# Patient Record
Sex: FEMALE | Race: WHITE | NOT HISPANIC OR LATINO | URBAN - METROPOLITAN AREA
[De-identification: names, ages, dates, MRNs, and addresses within clinical notes are randomized per-mention and may not be internally consistent; named-entity substitution may affect disease eponyms.]

---

## 2023-02-14 ENCOUNTER — APPOINTMENT (RX ONLY)
Dept: URBAN - METROPOLITAN AREA CLINIC 184 | Facility: CLINIC | Age: 68
Setting detail: DERMATOLOGY
End: 2023-02-14

## 2023-02-14 DIAGNOSIS — D22 MELANOCYTIC NEVI: ICD-10-CM | Status: STABLE

## 2023-02-14 DIAGNOSIS — L81.4 OTHER MELANIN HYPERPIGMENTATION: ICD-10-CM | Status: STABLE

## 2023-02-14 DIAGNOSIS — D18.0 HEMANGIOMA: ICD-10-CM

## 2023-02-14 DIAGNOSIS — L82.1 OTHER SEBORRHEIC KERATOSIS: ICD-10-CM

## 2023-02-14 DIAGNOSIS — L57.0 ACTINIC KERATOSIS: ICD-10-CM

## 2023-02-14 PROBLEM — D22.5 MELANOCYTIC NEVI OF TRUNK: Status: ACTIVE | Noted: 2023-02-14

## 2023-02-14 PROBLEM — D18.01 HEMANGIOMA OF SKIN AND SUBCUTANEOUS TISSUE: Status: ACTIVE | Noted: 2023-02-14

## 2023-02-14 PROBLEM — D22.71 MELANOCYTIC NEVI OF RIGHT LOWER LIMB, INCLUDING HIP: Status: ACTIVE | Noted: 2023-02-14

## 2023-02-14 PROBLEM — D23.71 OTHER BENIGN NEOPLASM OF SKIN OF RIGHT LOWER LIMB, INCLUDING HIP: Status: ACTIVE | Noted: 2023-02-14

## 2023-02-14 PROBLEM — D22.61 MELANOCYTIC NEVI OF RIGHT UPPER LIMB, INCLUDING SHOULDER: Status: ACTIVE | Noted: 2023-02-14

## 2023-02-14 PROBLEM — D22.72 MELANOCYTIC NEVI OF LEFT LOWER LIMB, INCLUDING HIP: Status: ACTIVE | Noted: 2023-02-14

## 2023-02-14 PROBLEM — D22.62 MELANOCYTIC NEVI OF LEFT UPPER LIMB, INCLUDING SHOULDER: Status: ACTIVE | Noted: 2023-02-14

## 2023-02-14 PROCEDURE — ? LIQUID NITROGEN

## 2023-02-14 PROCEDURE — 99213 OFFICE O/P EST LOW 20 MIN: CPT | Mod: 25

## 2023-02-14 PROCEDURE — ? COUNSELING

## 2023-02-14 PROCEDURE — 17000 DESTRUCT PREMALG LESION: CPT

## 2023-02-14 ASSESSMENT — LOCATION DETAILED DESCRIPTION DERM
LOCATION DETAILED: RIGHT ANTERIOR DISTAL UPPER ARM
LOCATION DETAILED: LEFT ANTERIOR PROXIMAL THIGH
LOCATION DETAILED: LEFT UPPER CUTANEOUS LIP
LOCATION DETAILED: RIGHT ANTERIOR PROXIMAL UPPER ARM
LOCATION DETAILED: LEFT ANTERIOR DISTAL THIGH
LOCATION DETAILED: LEFT ANTERIOR DISTAL UPPER ARM
LOCATION DETAILED: MIDDLE STERNUM
LOCATION DETAILED: LEFT SUPERIOR UPPER BACK
LOCATION DETAILED: LEFT ANTERIOR PROXIMAL UPPER ARM
LOCATION DETAILED: RIGHT ANTERIOR PROXIMAL THIGH
LOCATION DETAILED: EPIGASTRIC SKIN
LOCATION DETAILED: PERIUMBILICAL SKIN
LOCATION DETAILED: RIGHT ANTERIOR DISTAL THIGH

## 2023-02-14 ASSESSMENT — LOCATION SIMPLE DESCRIPTION DERM
LOCATION SIMPLE: CHEST
LOCATION SIMPLE: ABDOMEN
LOCATION SIMPLE: LEFT THIGH
LOCATION SIMPLE: LEFT LIP
LOCATION SIMPLE: RIGHT THIGH
LOCATION SIMPLE: LEFT UPPER BACK
LOCATION SIMPLE: RIGHT UPPER ARM
LOCATION SIMPLE: LEFT UPPER ARM

## 2023-02-14 ASSESSMENT — LOCATION ZONE DERM
LOCATION ZONE: TRUNK
LOCATION ZONE: LIP
LOCATION ZONE: ARM
LOCATION ZONE: LEG

## 2023-02-14 NOTE — PROCEDURE: COUNSELING
Sunscreen Recommendations: Discussed sunblock should be applied to exposed areas daily, and be reapplied every two hours while exposed . The sunblock should be broad spectrum SPF-50, UVA/UVB and contain Zinc and Titanium Dioxide(Blue Lizard & [employer] Crimes Bum are some good OTC brands). Strongly recommend Elta MD products. Recommend sun protective clothing such as long sleeve UPF protective shirts, wide brim hats, neck covers and sunglasses as it has been proven to prevent further photo damage from the sun.
Detail Level: Zone
Sunscreen Recommendations: Discussed sunblock should be applied to exposed areas daily, and be reapplied every two hours while exposed . The sunblock should be broad spectrum SPF-50, UVA/UVB and contain Zinc and Titanium Dioxide(Blue Lizard & [employer] [place] Bum are some good OTC brands). Strongly recommend Elta MD products. Recommend sun protective clothing such as long sleeve UPF protective shirts, wide brim hats, neck covers and sunglasses as it has been proven to prevent further photo damage from the sun.
Detail Level: Detailed

## 2024-05-01 ENCOUNTER — APPOINTMENT (RX ONLY)
Dept: URBAN - METROPOLITAN AREA CLINIC 146 | Facility: CLINIC | Age: 69
Setting detail: DERMATOLOGY
End: 2024-05-01

## 2024-05-01 DIAGNOSIS — I87.2 VENOUS INSUFFICIENCY (CHRONIC) (PERIPHERAL): ICD-10-CM

## 2024-05-01 PROCEDURE — ? ADDITIONAL NOTES

## 2024-05-01 NOTE — HPI: VEIN EVALUATION
Which Leg Is Worse?: Equally Affected
How Severe Is/Are Your Symptoms?: moderate
Is This A New Presentation, Or A Follow-Up?: Veins

## 2024-05-01 NOTE — PROCEDURE: ADDITIONAL NOTES
Additional Notes: Patient came in for initial consult, ultrasound reveals venous insufficiency to left gsv. Patient has compression stockings from previous treatment. Will return for her medical vein appointment \\n\\nPatient presents with one or more of the following: (1) Symptomatic varices, (2) Swelling, (3) Skin changes, (4) Heaviness, (5) New or past venous ulcers.\\n\\nAn examination with ultrasound was done. The deep, superficial venous, and arterial system was evaluated. Based on the findings the patient has symptomatic superficial venous disease. Findings of the deep venous system was also noted. The etiology of the patient’s symptoms was explained. A return visit is scheduled for a more detailed ultrasound/examination and insurance authorization submission if applicable.
Detail Level: Simple
Render Risk Assessment In Note?: no

## 2024-07-08 ENCOUNTER — APPOINTMENT (RX ONLY)
Dept: URBAN - METROPOLITAN AREA CLINIC 146 | Facility: CLINIC | Age: 69
Setting detail: DERMATOLOGY
End: 2024-07-08

## 2024-07-08 DIAGNOSIS — I87.2 VENOUS INSUFFICIENCY (CHRONIC) (PERIPHERAL): ICD-10-CM | Status: WORSENING

## 2024-07-08 PROCEDURE — ? ADDITIONAL NOTES

## 2024-07-08 PROCEDURE — ? MEDICAL CONSULTATION: VENOUS DISEASE

## 2024-07-08 PROCEDURE — ? CEAP-C CLASSIFICATION

## 2024-07-08 PROCEDURE — ? LOWER EXTREMITY DOPPLER US

## 2024-07-08 PROCEDURE — 93971 EXTREMITY STUDY: CPT

## 2024-07-08 PROCEDURE — 99213 OFFICE O/P EST LOW 20 MIN: CPT

## 2024-07-08 PROCEDURE — ? SEPARATE AND IDENTIFIABLE DOCUMENTATION

## 2024-07-08 ASSESSMENT — LOCATION DETAILED DESCRIPTION DERM
LOCATION DETAILED: LEFT ANTERIOR DISTAL THIGH
LOCATION DETAILED: LEFT ANTERIOR PROXIMAL THIGH
LOCATION DETAILED: LEFT PROXIMAL PRETIBIAL REGION

## 2024-07-08 ASSESSMENT — LOCATION SIMPLE DESCRIPTION DERM
LOCATION SIMPLE: LEFT PRETIBIAL REGION
LOCATION SIMPLE: LEFT THIGH

## 2024-07-08 ASSESSMENT — LOCATION ZONE DERM: LOCATION ZONE: LEG

## 2024-07-08 NOTE — PROCEDURE: CEAP-C CLASSIFICATION
Left Leg: Peripheral Vascular Disease?: No
Left Dorsalis Pedis Pulse: 2 (Easily palpable)
Left Leg Circumference: medium
Follow Up Instructions:: Patient will follow up after the bilateral duplex ultrasound venous reflux study to review the results and finalize treatment plan. The patient must wear compression stockings. Preventive strategies include weight loss through diet and exercise and toning leg muscles. A venous fact sheet was given, which reviews venous anatomy/pathophysiology and treatment options. The pathophysiology of venous disease and potential treatment options were discussed in detail, especially the non-FDA status of foam sclerotherapy with its risks benefits and alternatives. The patient's questions were answered in full.
Detail Level: Detailed
Show Diagnoses Variable: Yes

## 2024-07-08 NOTE — PROCEDURE: LOWER EXTREMITY DOPPLER US
Intraluminal Thrombus: No
Detail Level: Simple
Size: 5-6 mm
Continue Conservative Therapy: Yes
Reflux: 3.0-3.5 seconds
Left Intraluminal Thrombus- No: The left deep veins were imaged from the level of the common femoral vein to the posterior tibial veins. All deep veins demonstrated compressibility without evidence of intraluminal thrombus.
Left Intraluminal Thrombus- Yes: The left deep veins were imaged from the level of the common femoral vein to the posterior tibial veins. There was evidence of intraluminal thrombus as noted above.
Right Intraluminal Thrombus- Yes: The right deep veins were imaged from the level of the common femoral vein to the posterior tibial veins. There was evidence of intraluminal thrombus as noted above.
Size: 6-7 mm
Reflux Options: Use Range
See Attached Documentation Text: Please refer to the attached ultrasound documentation for complete details of the procedure and the venous findings.
Right Intraluminal Thrombus- No: The right deep veins were imaged from the level of the common femoral vein to the posterior tibial veins. All deep veins demonstrated compressibility without evidence of intraluminal thrombus.
Continue Conservative Therapy Text: Continue conservative treatment (such as compression stockings, OTC analgesics, and exercise) and consider intervention if no change or worsening symptoms to varicosities.
Left Tributary Size: 4-5 mm

## 2024-07-08 NOTE — PROCEDURE: MEDICAL CONSULTATION: VENOUS DISEASE
Left Leg Pain: 2- Daily, moderate activity limitation, occasional analgesics
Right Leg Varicose Veins: 0- None
Left Dorsalis Pedis Pulse: 2 (Easily palpable)
Left Leg Varicose Veins: 2- Multiple: GS varicose veins confined to calf or thigh
Detail Level: Detailed
Right Leg: Peripheral Vascular Disease?: No
Right Leg Circumference: medium
Left Leg Venous Edema: 2- Afternoon edema above ankle
Right Leg Venous Hyperpigmentation: 0- None or focal low intensity (tan)
Follow Up Instructions:: Patient will follow up after the bilateral duplex ultrasound venous reflux study to review the results and finalize treatment plan. The patient must wear compression stockings. Preventive strategies include weight loss through diet and exercise and toning leg muscles. A venous fact sheet was given, which reviews venous anatomy/pathophysiology and treatment options. The pathophysiology of venous disease and potential treatment options were discussed in detail, especially the non-FDA status of foam sclerotherapy with its risks benefits and alternatives. The patient's questions were answered in full.
Include Vcss In The Note?: Yes
Right Leg Compression Therapy: 0- None or noncompliant
Left Leg Compression Therapy: 3- Full compliance: stockings and elevation

## 2024-10-02 ENCOUNTER — APPOINTMENT (RX ONLY)
Dept: URBAN - METROPOLITAN AREA CLINIC 184 | Facility: CLINIC | Age: 69
Setting detail: DERMATOLOGY
End: 2024-10-02

## 2024-10-02 DIAGNOSIS — L81.4 OTHER MELANIN HYPERPIGMENTATION: ICD-10-CM

## 2024-10-02 DIAGNOSIS — D49.2 NEOPLASM OF UNSPECIFIED BEHAVIOR OF BONE, SOFT TISSUE, AND SKIN: ICD-10-CM

## 2024-10-02 DIAGNOSIS — L57.8 OTHER SKIN CHANGES DUE TO CHRONIC EXPOSURE TO NONIONIZING RADIATION: ICD-10-CM | Status: INADEQUATELY CONTROLLED

## 2024-10-02 DIAGNOSIS — L57.0 ACTINIC KERATOSIS: ICD-10-CM

## 2024-10-02 DIAGNOSIS — I83.9 ASYMPTOMATIC VARICOSE VEINS OF LOWER EXTREMITIES: ICD-10-CM

## 2024-10-02 DIAGNOSIS — D22 MELANOCYTIC NEVI: ICD-10-CM

## 2024-10-02 DIAGNOSIS — L82.1 OTHER SEBORRHEIC KERATOSIS: ICD-10-CM

## 2024-10-02 DIAGNOSIS — L85.3 XEROSIS CUTIS: ICD-10-CM | Status: INADEQUATELY CONTROLLED

## 2024-10-02 DIAGNOSIS — D18.0 HEMANGIOMA: ICD-10-CM

## 2024-10-02 DIAGNOSIS — Z71.89 OTHER SPECIFIED COUNSELING: ICD-10-CM

## 2024-10-02 PROBLEM — D22.5 MELANOCYTIC NEVI OF TRUNK: Status: ACTIVE | Noted: 2024-10-02

## 2024-10-02 PROBLEM — I83.93 ASYMPTOMATIC VARICOSE VEINS OF BILATERAL LOWER EXTREMITIES: Status: ACTIVE | Noted: 2024-10-02

## 2024-10-02 PROBLEM — D18.01 HEMANGIOMA OF SKIN AND SUBCUTANEOUS TISSUE: Status: ACTIVE | Noted: 2024-10-02

## 2024-10-02 PROCEDURE — ? SUNSCREEN TREATMENT REGIMEN

## 2024-10-02 PROCEDURE — ? PRESCRIPTION

## 2024-10-02 PROCEDURE — ? COUNSELING

## 2024-10-02 PROCEDURE — ? SUNSCREEN RECOMMENDATIONS

## 2024-10-02 PROCEDURE — 99214 OFFICE O/P EST MOD 30 MIN: CPT | Mod: 25

## 2024-10-02 PROCEDURE — ? ADDITIONAL NOTES

## 2024-10-02 PROCEDURE — 11102 TANGNTL BX SKIN SINGLE LES: CPT

## 2024-10-02 PROCEDURE — ? BIOPSY BY SHAVE METHOD

## 2024-10-02 PROCEDURE — ? OTC TREATMENT REGIMEN

## 2024-10-02 PROCEDURE — 11103 TANGNTL BX SKIN EA SEP/ADDL: CPT

## 2024-10-02 RX ORDER — FLUOROURACIL 5 MG/G
CREAM TOPICAL BID
Qty: 40 | Refills: 2 | Status: ERX | COMMUNITY
Start: 2024-10-02

## 2024-10-02 RX ADMIN — FLUOROURACIL: 5 CREAM TOPICAL at 00:00

## 2024-10-02 ASSESSMENT — LOCATION DETAILED DESCRIPTION DERM
LOCATION DETAILED: RIGHT SUPERIOR FOREHEAD
LOCATION DETAILED: INFERIOR THORACIC SPINE
LOCATION DETAILED: LEFT SUPERIOR UPPER BACK
LOCATION DETAILED: LEFT SUPERIOR MEDIAL MIDBACK
LOCATION DETAILED: INFERIOR MID FOREHEAD
LOCATION DETAILED: RIGHT PROXIMAL PRETIBIAL REGION
LOCATION DETAILED: SUPERIOR THORACIC SPINE
LOCATION DETAILED: LEFT INFERIOR LATERAL MIDBACK
LOCATION DETAILED: RIGHT ANTERIOR DISTAL UPPER ARM
LOCATION DETAILED: LEFT ANTERIOR DISTAL UPPER ARM
LOCATION DETAILED: RIGHT ANTERIOR PROXIMAL UPPER ARM
LOCATION DETAILED: LEFT MEDIAL UPPER BACK
LOCATION DETAILED: RIGHT DISTAL PRETIBIAL REGION
LOCATION DETAILED: RIGHT MID-UPPER BACK
LOCATION DETAILED: RIGHT SUPERIOR CENTRAL MALAR CHEEK
LOCATION DETAILED: MIDDLE STERNUM
LOCATION DETAILED: LEFT PROXIMAL PRETIBIAL REGION
LOCATION DETAILED: LEFT DISTAL PRETIBIAL REGION

## 2024-10-02 ASSESSMENT — LOCATION SIMPLE DESCRIPTION DERM
LOCATION SIMPLE: UPPER BACK
LOCATION SIMPLE: RIGHT FOREHEAD
LOCATION SIMPLE: LEFT PRETIBIAL REGION
LOCATION SIMPLE: RIGHT UPPER BACK
LOCATION SIMPLE: CHEST
LOCATION SIMPLE: RIGHT CHEEK
LOCATION SIMPLE: INFERIOR FOREHEAD
LOCATION SIMPLE: LEFT LOWER BACK
LOCATION SIMPLE: RIGHT UPPER ARM
LOCATION SIMPLE: LEFT UPPER ARM
LOCATION SIMPLE: LEFT UPPER BACK
LOCATION SIMPLE: RIGHT PRETIBIAL REGION

## 2024-10-02 ASSESSMENT — LOCATION ZONE DERM
LOCATION ZONE: ARM
LOCATION ZONE: TRUNK
LOCATION ZONE: LEG
LOCATION ZONE: FACE

## 2024-10-02 NOTE — PROCEDURE: BIOPSY BY SHAVE METHOD
Detail Level: Detailed
Depth Of Biopsy: dermis
Was A Bandage Applied: Yes
Size Of Lesion In Cm: 0
Biopsy Type: H and E
Biopsy Method: Personna blade
Anesthesia Type: 1% lidocaine with epinephrine
Anesthesia Volume In Cc: 0.5
Hemostasis: Aluminum Chloride
Wound Care: Vaseline
Dressing: Band-Aid
Destruction After The Procedure: No
Type Of Destruction Used: Curettage
Curettage Text: The wound bed was treated with curettage after the biopsy was performed.
Cryotherapy Text: The wound bed was treated with cryotherapy after the biopsy was performed.
Electrodesiccation Text: The wound bed was treated with electrodesiccation after the biopsy was performed.
Electrodesiccation And Curettage Text: The wound bed was treated with electrodesiccation and curettage after the biopsy was performed.
Silver Nitrate Text: The wound bed was treated with silver nitrate after the biopsy was performed.
Lab: -[id number]
Consent: Written consent was obtained and risks were reviewed including but not limited to scarring, infection, bleeding, scabbing, incomplete removal, nerve damage and allergy to anesthesia.
Post-Care Instructions: I reviewed with the patient in detail post-care instructions. Patient is to keep the biopsy site dry overnight, and then apply bacitracin twice daily until healed. Patient may apply hydrogen peroxide soaks to remove any crusting.
Notification Instructions: Patient will be notified of biopsy results. However, patient instructed to call the office if not contacted within 2 weeks.
Billing Type: Third-Party Bill
Information: Selecting Yes will display possible errors in your note based on the variables you have selected. This validation is only offered as a suggestion for you. PLEASE NOTE THAT THE VALIDATION TEXT WILL BE REMOVED WHEN YOU FINALIZE YOUR NOTE. IF YOU WANT TO FAX A PRELIMINARY NOTE YOU WILL NEED TO TOGGLE THIS TO 'NO' IF YOU DO NOT WANT IT IN YOUR FAXED NOTE.
Body Location Override (Optional - Billing Will Still Be Based On Selected Body Map Location If Applicable): right lateral scalp

## 2024-10-02 NOTE — PROCEDURE: ADDITIONAL NOTES
Render Risk Assessment In Note?: no
Detail Level: Simple
Additional Notes: Pt will be seeing  for treatment

## 2024-10-07 ENCOUNTER — APPOINTMENT (RX ONLY)
Dept: URBAN - METROPOLITAN AREA CLINIC 146 | Facility: CLINIC | Age: 69
Setting detail: DERMATOLOGY
End: 2024-10-07

## 2024-10-07 DIAGNOSIS — I87.2 VENOUS INSUFFICIENCY (CHRONIC) (PERIPHERAL): ICD-10-CM | Status: WORSENING

## 2024-10-07 PROCEDURE — ? ADDITIONAL NOTES

## 2024-10-07 PROCEDURE — 37765 STAB PHLEB VEINS XTR 10-20: CPT

## 2024-10-07 PROCEDURE — 36465 NJX NONCMPND SCLRSNT 1 VEIN: CPT | Mod: LT

## 2024-10-07 PROCEDURE — ? PHLEBECTOMY

## 2024-10-07 PROCEDURE — ? VARITHENA SCLEROTHERAPY

## 2024-10-07 ASSESSMENT — LOCATION ZONE DERM: LOCATION ZONE: LEG

## 2024-10-07 ASSESSMENT — LOCATION DETAILED DESCRIPTION DERM
LOCATION DETAILED: LEFT PROXIMAL PRETIBIAL REGION
LOCATION DETAILED: LEFT DISTAL PRETIBIAL REGION

## 2024-10-07 ASSESSMENT — LOCATION SIMPLE DESCRIPTION DERM: LOCATION SIMPLE: LEFT PRETIBIAL REGION

## 2024-10-07 NOTE — PROCEDURE: VARITHENA SCLEROTHERAPY
Number Of Injections (Will Not Render If 0): 0
Post-Care Instructions: Compression for 3 weeks is critical to optimize your recovery and results. Compliance with compression helps to prevent blood clots and minimizes pain, swelling, bruising, skin discoloration (staining) and the recurrence of vessels.        Materials to gather for your wound care (all available over the counter):        Compression stockings x 2 pairs, 4 x 4 gauze, Comprilan wrap: 8 cm and 10 cm width wrap, Medical adhesive tape.       Compression and Wound care;  Leg compression for 3 weeks is fonseca to your success and safety. Compression at night is only needed the first day. After that, compression is needed only during waking hours.  However, if your leg feels better with compression at night, then you may continue compression at night as tolerated.       After the sclerotherapy procedure, 2 layers compression will be placed.       1. On the skin, folded or flat gauze will cover the treated areas.       2. A compression wrap (Comprilan) will be wrapped around your leg over the gauze. Once the compression wrap is in place, a compression stocking will be worn. This two layer  compression (wrap plus stocking) should be worn for the first 24 hours if tolerated.       3. After 24 hours, remove all compressions and dressings and just wear the compression stockings only during waking hours.        You will need to wear compression stockings for three weeks after your procedure, unless your physician instructs you otherwise.       Activity:       It is important NOT to be sitting or lying down for several hours after surgery. You may begin walking immediately after surgery. This is good for you, but take it easy.       For 2 days after treatment: Avoid aerobic exercise, weight training, and all other types of exertion that increase your breathing and pulse rates.       Do not get a tan for one month after sclerotherapy. Tanning increases your risk of skin discoloration.      Bathing:       After 24 hours, you may shower or bathe in tepid water, but keep the compression stocking on. Avoid immersion in hot tubs.      For pain or discomfort:       You may take acetaminophen (TylenolTM, Extra-Strength TylenolTM or DatrilTM) as directed.       Do not use aspirin, products containing aspirin, or ibuprofen (for example AdvilTM or MotrinTM) for five days after your surgery, unless approved by your physician.       Dietary restrictions: Do not drink alcoholic beverages for two days after your sclerotherapy procedure.       Possible side effects following sclerotherapy:  After sclerotherapy, mild swelling is expected. The injection sites may also become bruised or gray. You may also experience one or more of the following side effects, which almost always go away within one to four months:       Darkening of the injected veins.       Brownish staining of the skin.       Small clotted vessels under the surface of the skin that you can feel.       Bruising of the injection sites:       What to do about bruising - This will resolve within 2-3 weeks. If you wish the bruising to disappear sooner, then applying Arnicare cream (over the counter, health food stores) will help.       What to do if you feel small clotted vessels under the surface of the skin:       Call us for a follow up appointment. These small clots can be drained through a small nick.       Draining these small clots will help you heal faster and with less discoloration.       Contact your physician if you experience any of the following:       Treated areas become increasingly sore, tender, red or warm.        Acetaminophen does not relieve your discomfort.        Injection sites turn black or the skin around the site breaks down.        Ulceration of the injection sites.       You develop blisters from the tape.       You develop significant swelling or pain in the leg.       Darkening of large areas of the skin or foot.
Sclerosant (A): Varithena Foam
Expiration Date A (Month Year): 05/25
Lot # A: [id number]
Show Inventory Tab: Hide
Consent was obtained with risks, benefits, and alternatives discussed for the above procedures.  Photographs were taken.
Sclerosant Volume (Cc): 3
Detail Level: Detailed
Render Post Care In Note: No
Disposition: Compression stockings and short stretch elastic bandages were placed postoperatively.

## 2024-10-07 NOTE — PROCEDURE: PHLEBECTOMY
Detail Level: Detailed
Consent was obtained with risks, benefits, and alternatives discussed for the above procedures.  Photographs were taken. Preoperative medications were taken as above.
Number Of Incisions Per Microphlebectomy: 10
Medical Necessity Clause: This therapy was medically necessary because the patient has
Epidermal Sutures (Optional): 5-0 Ethilon
Estimated Blood Loss (Cc): minimal
Disposition: Compression dressings were placed and stockings. Wound care instructions were given, verbal and written.
Post-Care Instructions: Compression is critical to optimize your recovery and results. Compression helps to prevent blood clots and minimizes pain, swelling, bruising and bleeding.       \nMaterials to gather for your wound care (all available over the counter)-       \n1. Compression stockings x 2 pairs      \n2. Coban or Comprilan wraps (ACE Wraps are not a good substitute) x 2      \n3. Hibiclens solution      \n4. Telfa pads (nonstick gauze)      \n5. 4 x 4 gauze      \n6. Aquaphor or Vaseline ointment       \n7. Medical adhesive tape      \n___1. If you have had sedation, you must have a  with you for the first 24 hours after surgery and must not operate any motor vehicles or equipment that requires alertness and coordination.        \n___2. Activity       \n - It is important NOT to be sitting or lying down for several hours after surgery. You may begin walking immediately after surgery.       \n - As part of your surgery, an anesthetic fluid was used to numb your skin. Most of this fluid is safely absorbed by your body but some of the fluid may drain from the small skin incisions.       \n - For 2 days after treatment: Avoid aerobic exercise, weight training, and all other types of exertion that increase your breathing and pulse rates.       \n___3. Compression and Wound care; Leg compression is vital to your success and safety. It is needed for 2 weeks minimum.       \nAfter your procedure, we will place gauze over your treated site(s) followed by a wrap (Comprilan or Coban), which is left in place for 24 hours. Your elastic stocking is then worn over the wrap.  At bedtime, you may remove the stocking to sleep but keep the inner wrap (Comprilan or Coban) on.         \na. After 24 hours, remove the wrap and gauze. Make sure you are lying down.       \nb. If you experience bleeding from the surgery site, place gauze over the area and apply firm pressure for 15 minutes without lifting. You may repeat this once. If bleeding persists, contact your physician.       \nc. Change your dressing once daily.  Lie down and remove the stockings and inner Comprilan wrap but not the gauze.  Take a shower with the gauze on and get it wet.       \nd. Remove the wet gauze in the shower and clean the incisions with diluted Hibiclens solution. Finish showering and pat dry.      \ne. Place some ointment (Aquaphor, Vaseline) over any incision sites and apply Telfa or non-stick gauze pad. Secure the Telfa or non-stick gauze pad with medical tape.         \nf. While lying down, rewrap your treated leg with Coban or Comprilan  followed by your stocking. The inner wrap and outer stocking combination should be worn for the first 7 days.        \ng. After the first week, you may discontinue the inner wrap and only wear stockings for another 7 days. The longer you wear the stockings beyond the minimum 2 weeks, the faster you will heal.      \n___4. Bathing       \n - Do not bathe for 24 hours. After 24 hours, you may shower in warm (not hot) water.       \n - Clean your surgery sites during showering (3e) and when you are finished bathing, pat your leg dry with a towel and repeat wound care instructions in 3f and 3g.       \n - For one week after treatment:  Avoid immersion in hot tubs      \n___5. Discomfort;  Any pain or discomfort should decrease with each day after surgery.       \n - You may take acetaminophen (Tylenol, Extra-Strength Tylenol or Datril) or prescribed medicines as directed. If your pain is not better, then please contact your physician      \n - Do not use aspirin, products containing aspirin, or ibuprofen (for example AdvilTM or MotrinTM) for five days after your surgery, unless approved by your physician.       \n___6. Dietary restrictions;  Do not drink alcoholic beverages for two days after your phlebectomy.       \nWhat to expect after microphlebectomy       \n - Bruising and swelling at the site of the surgery are expected and is usually gone by 2 weeks.       \n - When to contact your physician Contact your physician if you experience any of the following:       \n - Treated areas become increasingly sore, tender, red or warm.       \n - Incision sites have pus like drainage      \n - Acetaminophen does not relieve your discomfort       \n - Fever higher than 100.4 degrees Fahrenheit (38 degrees Celsius)
Hemostasis: Pressure
Medical Necessity Information: LCD Guidelines vary from payer to payer. Please check with your payer's policy to determine medical necessity.

## 2024-10-07 NOTE — PROCEDURE: ADDITIONAL NOTES
Render Risk Assessment In Note?: no
Detail Level: Simple
Additional Notes: Consent was obtained with risks, benefits, and alternatives discussed for the documented procedures. Photographs were taken. Preoperative medications were taken as above. This therapy was medically necessary because the patient has failed a 3-month trial of conservative therapy (such as: exercise, periodic leg elevation, weight loss, compressive therapy and avoidance of prolonged immobility) and no evidence of aneurysm in the target segment. \\n\\nThe patient was seen today for ultrasound-guided micro foam chemical ablation with Varithena (polidocanol foam) 1%. The procedure was explained in depth to the patient and informed consent was signed. Alternative treatment options discussed. Questions answered. The patient voiced understanding of the procedure and potential complications including but not limited to infection, bleeding, DVT, pulmonary embolism, stroke, skin discoloration, ulcer, arterial injury paresthesia’s, anaphylactic reaction. \\n\\nThe patient was prepped and draped in the usual sterile fashion. The targeted vessel was identified using ultrasound guidance. The vein (s) were cannulated using a scalp vein set 25G x 3/4. Blood return was verified. The leg was elevated for 5 minutes. Using aseptic technique, Varithena was withdrawn from the drug canister using the Varithena Transfer Unit to a sterile syringe. Under ultrasound guidance, Varithena was injected into the cannulated vein(s) and the foam was followed through the veins with ultrasound visualization. Compression was applied at the junction while foam was injected. Venospam of the treated vein was confirmed using ultrasound. Total volume of Varithena used was 2.5 mL,. No foam was identified entering the deep venous system. Direct pressure was held at the puncture site manually and hemostasis was obtained. The area was cleansed with alcohol and dried thoroughly. The leg(s) was elevated with patient remaining on the table for 10-15 minutes to observe for anaphylactic reaction without incident. A 20-30 mmHg compression stocking was placed on patient. The leg(s) was lowered only after compression had been applied and the patient was immediately ambulatory. Patient tolerated the procedure well and left the operating room ambulating in stable condition without apparent concerns at time of release. \\n\\nMicro-Phlebectomy alleviates refractory painful high venous pressure by removing portions of the incompetent vein. This procedure is to be done on varicose veins which are nonresponsive to Endovenous Laser Treatment (EVLT) or Ultrasound Guided Sclerotherapy (USGS). This is necessary to treat vessels that are involved in transmission of high venous pressure, which can cause the patient to continue to have pain and the veins may clot. \\n\\nThe procedure was explained in depth. Alternative treatment options were discussed. Potential complications including but, not limited to infections, bleeding, DVT, pulmonary embolism, skin burns, discoloration, nerve injury, arterial injury, ulcers, and paresthesia’s were discussed. Questions were sought and answered. Patient expressed understanding. Patient agreed to proceed. Venous branches were marked on the leg while the patient was in a standing position using a surgical marker.  Area is prepped with hibiclens soap. Sterile drapes placed. Tumescent solution (450 ml 0.9 NS, 50 ml lidocaine 1% with epinephrine and 16 ml of Sodium Bicarbonate) was injected sub-dermally 2 inches around the marked areas.  Small incisions were made at the previously marked veins using a #11 blade. Small incisions were made at the previously marked veins using a #11 blade. A micro-phlebectomy hook was then used to avulse the veins.\\n\\nLeg washed with antibacterial soap & water, dried, applied Dermaka cream to treated areas, compression foam pads, ABD pads, kerlix, coban, ACE wraps, tape, & secured with safety pins. Patient tolerated the procedure well and left the operating room ambulating in stable condition. Post-operative instructions given and reviewed with patient verbally and in writing. Instructed to take Ibuprofen 600mg every 4-6 hours or Aleve 500mg every 12 hours or Tylenol 500mg every 6 hours for one week with food, walk for 10 minutes every hour the patient is awake. Patient to remove dressings in 24/48 hours and wear thigh high compression stockings for 7 days if tolerated. No heavy lifting over 25 lbs and no strenuous exercising. The patient was given written instructions with a phone number to contact with any issues or concerns. Patient verbalized understanding of all instructions. Questions encouraged and answered. Patient is to return to clinic in 5-7 days for follow-up post treatment ultrasound.
Additional Notes: Vermerol Vein open at completion of the procedure

## 2024-10-11 ENCOUNTER — APPOINTMENT (RX ONLY)
Dept: URBAN - METROPOLITAN AREA CLINIC 184 | Facility: CLINIC | Age: 69
Setting detail: DERMATOLOGY
End: 2024-10-11

## 2024-10-11 DIAGNOSIS — L24.4 IRRITANT CONTACT DERMATITIS DUE TO DRUGS IN CONTACT WITH SKIN: ICD-10-CM

## 2024-10-11 PROCEDURE — ? PRESCRIPTION

## 2024-10-11 PROCEDURE — ? COUNSELING

## 2024-10-11 PROCEDURE — 99213 OFFICE O/P EST LOW 20 MIN: CPT | Mod: 24

## 2024-10-11 PROCEDURE — ? PRESCRIPTION MEDICATION MANAGEMENT

## 2024-10-11 RX ORDER — HYDROCORTISONE 25 MG/G
CREAM TOPICAL
Qty: 30 | Refills: 1 | Status: ERX | COMMUNITY
Start: 2024-10-11

## 2024-10-11 RX ADMIN — HYDROCORTISONE: 25 CREAM TOPICAL at 00:00

## 2024-10-11 ASSESSMENT — LOCATION DETAILED DESCRIPTION DERM
LOCATION DETAILED: RIGHT CENTRAL MALAR CHEEK
LOCATION DETAILED: LEFT CENTRAL MALAR CHEEK

## 2024-10-11 ASSESSMENT — LOCATION ZONE DERM: LOCATION ZONE: FACE

## 2024-10-11 ASSESSMENT — LOCATION SIMPLE DESCRIPTION DERM
LOCATION SIMPLE: RIGHT CHEEK
LOCATION SIMPLE: LEFT CHEEK

## 2024-10-11 NOTE — PROCEDURE: PRESCRIPTION MEDICATION MANAGEMENT
Continue Regimen: Efudex up to the 14 th of this month.
Initiate Treatment: Hydrocortisone 2.5 % cream as directed.
Render In Strict Bullet Format?: No
Detail Level: Zone

## 2024-10-15 ENCOUNTER — APPOINTMENT (RX ONLY)
Dept: URBAN - METROPOLITAN AREA CLINIC 146 | Facility: CLINIC | Age: 69
Setting detail: DERMATOLOGY
End: 2024-10-15

## 2024-10-15 DIAGNOSIS — Z48.02 ENCOUNTER FOR REMOVAL OF SUTURES: ICD-10-CM

## 2024-10-15 PROCEDURE — ? SUTURE REMOVAL (GLOBAL PERIOD)

## 2024-10-15 ASSESSMENT — LOCATION SIMPLE DESCRIPTION DERM: LOCATION SIMPLE: LEFT PRETIBIAL REGION

## 2024-10-15 ASSESSMENT — LOCATION DETAILED DESCRIPTION DERM: LOCATION DETAILED: LEFT PROXIMAL PRETIBIAL REGION

## 2024-10-15 ASSESSMENT — LOCATION ZONE DERM: LOCATION ZONE: LEG

## 2024-10-15 NOTE — PROCEDURE: SUTURE REMOVAL (GLOBAL PERIOD)
Detail Level: Detailed
Add 36795 Cpt? (Important Note: In 2017 The Use Of 40895 Is Being Tracked By Cms To Determine Future Global Period Reimbursement For Global Periods): no

## 2024-12-18 ENCOUNTER — APPOINTMENT (OUTPATIENT)
Dept: URBAN - METROPOLITAN AREA CLINIC 184 | Facility: CLINIC | Age: 69
Setting detail: DERMATOLOGY
End: 2024-12-18

## 2024-12-18 DIAGNOSIS — L57.0 ACTINIC KERATOSIS: ICD-10-CM

## 2024-12-18 DIAGNOSIS — L81.4 OTHER MELANIN HYPERPIGMENTATION: ICD-10-CM

## 2024-12-18 PROCEDURE — 99213 OFFICE O/P EST LOW 20 MIN: CPT

## 2024-12-18 PROCEDURE — ? ADDITIONAL NOTES

## 2024-12-18 PROCEDURE — ? COUNSELING

## 2024-12-18 ASSESSMENT — LOCATION ZONE DERM: LOCATION ZONE: FACE

## 2024-12-18 ASSESSMENT — LOCATION SIMPLE DESCRIPTION DERM
LOCATION SIMPLE: LEFT CHEEK
LOCATION SIMPLE: RIGHT CHEEK

## 2024-12-18 ASSESSMENT — LOCATION DETAILED DESCRIPTION DERM
LOCATION DETAILED: RIGHT CENTRAL MALAR CHEEK
LOCATION DETAILED: LEFT SUPERIOR LATERAL MALAR CHEEK

## 2025-01-23 ENCOUNTER — APPOINTMENT (OUTPATIENT)
Dept: URBAN - METROPOLITAN AREA CLINIC 146 | Facility: CLINIC | Age: 70
Setting detail: DERMATOLOGY
End: 2025-01-23

## 2025-01-23 DIAGNOSIS — I87.2 VENOUS INSUFFICIENCY (CHRONIC) (PERIPHERAL): ICD-10-CM

## 2025-01-23 PROCEDURE — ? ADDITIONAL NOTES

## 2025-01-23 PROCEDURE — ? LOWER EXTREMITY DOPPLER US

## 2025-01-23 NOTE — PROCEDURE: ADDITIONAL NOTES
Additional Notes: The superficial area that was clotted was prepped with alcohol. Subdermal tumescent was infiltrated. Using #11 blade small incision was made & clot evacuated. Site cleaned with antibacterial soap & water, dry dressing. No charge visit today.
Detail Level: Simple
Render Risk Assessment In Note?: no

## 2025-01-23 NOTE — PROCEDURE: LOWER EXTREMITY DOPPLER US
Include Thrombophlebitis Instructions: Yes
Left Intraluminal Thrombus- No: The left deep veins were imaged from the level of the common femoral vein to the posterior tibial veins. All deep veins demonstrated compressibility without evidence of intraluminal thrombus.
Recommend Sclerotherapy With Ultrasound Guidance On Left Side: No
Right Intraluminal Thrombus- No: The right deep veins were imaged from the level of the common femoral vein to the posterior tibial veins. All deep veins demonstrated compressibility without evidence of intraluminal thrombus.
Continue Conservative Therapy Text: Continue conservative treatment (such as compression stockings, OTC analgesics, and exercise) and consider intervention if no change or worsening symptoms to varicosities.
See Attached Documentation Text: Please refer to the attached ultrasound documentation for complete details of the procedure and the venous findings.
Left Intraluminal Thrombus- Yes: The left deep veins were imaged from the level of the common femoral vein to the posterior tibial veins. There was evidence of intraluminal thrombus as noted above.
Right Intraluminal Thrombus- Yes: The right deep veins were imaged from the level of the common femoral vein to the posterior tibial veins. There was evidence of intraluminal thrombus as noted above.
Detail Level: Simple
Size Options: Use Range

## 2025-08-18 ENCOUNTER — APPOINTMENT (OUTPATIENT)
Dept: URBAN - METROPOLITAN AREA CLINIC 184 | Facility: CLINIC | Age: 70
Setting detail: DERMATOLOGY
End: 2025-08-18

## 2025-08-18 DIAGNOSIS — L81.4 OTHER MELANIN HYPERPIGMENTATION: ICD-10-CM

## 2025-08-18 DIAGNOSIS — D18.0 HEMANGIOMA: ICD-10-CM

## 2025-08-18 DIAGNOSIS — R23.3 SPONTANEOUS ECCHYMOSES: ICD-10-CM

## 2025-08-18 PROBLEM — D18.01 HEMANGIOMA OF SKIN AND SUBCUTANEOUS TISSUE: Status: ACTIVE | Noted: 2025-08-18

## 2025-08-18 PROCEDURE — ? COUNSELING

## 2025-08-18 PROCEDURE — ?

## 2025-08-18 ASSESSMENT — LOCATION SIMPLE DESCRIPTION DERM
LOCATION SIMPLE: RIGHT FOREARM
LOCATION SIMPLE: RIGHT POSTERIOR UPPER ARM
LOCATION SIMPLE: RIGHT HAND
LOCATION SIMPLE: LEFT FOREARM
LOCATION SIMPLE: LEFT POSTERIOR UPPER ARM

## 2025-08-18 ASSESSMENT — LOCATION ZONE DERM
LOCATION ZONE: ARM
LOCATION ZONE: HAND

## 2025-08-18 ASSESSMENT — LOCATION DETAILED DESCRIPTION DERM
LOCATION DETAILED: RIGHT PROXIMAL DORSAL FOREARM
LOCATION DETAILED: RIGHT RADIAL DORSAL HAND
LOCATION DETAILED: LEFT PROXIMAL DORSAL FOREARM
LOCATION DETAILED: RIGHT DISTAL POSTERIOR UPPER ARM
LOCATION DETAILED: LEFT DISTAL DORSAL FOREARM
LOCATION DETAILED: LEFT DISTAL POSTERIOR UPPER ARM
LOCATION DETAILED: RIGHT DISTAL DORSAL FOREARM